# Patient Record
(demographics unavailable — no encounter records)

---

## 2025-03-26 NOTE — HISTORY OF PRESENT ILLNESS
[de-identified] : Ms. Catina Shea is 65 years old female with erythrocytosis here for consultation, referred by   Flash   2/6/2025 H/H 15.6/47.6 .2

## 2025-03-26 NOTE — HISTORY OF PRESENT ILLNESS
[de-identified] : Ms. Catina Shea is 65 years old female with erythrocytosis here for consultation, referred by   Flash   2/6/2025 H/H 15.6/47.6 .2